# Patient Record
(demographics unavailable — no encounter records)

---

## 2024-11-29 NOTE — DEVELOPMENTAL MILESTONES
[Laughs aloud] : laughs aloud [Turns to voice] : turns to voice [Vocalizes with extending cooing] : vocalizes with extending cooing [Rolls over prone to supine] : rolls over prone to supine [Supports on elbows & wrists in prone] : supports on elbows and wrists in prone [Plays with fingers in midline] : plays with fingers in midline [Grasps objects] : grasps objects

## 2024-11-29 NOTE — DISCUSSION/SUMMARY
[FreeTextEntry1] :  4 month old female here for [facility].   Acute URI - Provided education about diagnosis, and time course of illness. Close monitoring of urinary output.  - Flu/COVID/RSV PCR pending - Reviewed Return precautions  WC - continue ad kunal feeds, return for feeding intolerance  - Counseled on introducing solids - one new food per 2-3 days to monitor for reaction. - continue safe sleep practice - alone, on back and in crib/bassinet. No toys, stuffed, animals, heavy blankets or bumpers - encouraged tummy time to improve head control when awake - Reviewed anticipatory guidance re: fevers, car seat safety - Family defers vaccines today until Arvika is well. to return for Rotavirus, Pentacel & Prevnar - Return in 2mo for routine 6mo WC

## 2024-11-29 NOTE — PHYSICAL EXAM
[Alert] : alert [Acute Distress] : no acute distress [Normocephalic] : normocephalic [Flat Open Anterior [place]] : flat open anterior fontanelle [Red Reflex] : red reflex bilateral [PERRL] : PERRL [Normally Placed Ears] : normally placed ears [Auricles Well Formed] : auricles well formed [Clear Tympanic membranes] : clear tympanic membranes [Light reflex present] : light reflex present [Bony landmarks visible] : bony landmarks visible [Discharge] : no discharge [Nares Patent] : nares patent [Palate Intact] : palate intact [Uvula Midline] : uvula midline [Palpable Masses] : no palpable masses [Regular Rate and Rhythm] : regular rate and rhythm [S1, S2 present] : S1, S2 present [Murmurs] : no murmurs [+2 Femoral Pulses] : (+) 2 femoral pulses [Soft] : soft [Tender] : nontender [Distended] : nondistended [Bowel Sounds] : bowel sounds present [Hepatomegaly] : no hepatomegaly [Splenomegaly] : no splenomegaly [External Genitalia] : normal external genitalia [Clitoromegaly] : no clitoromegaly [Normal Vaginal Introitus] : normal vaginal introitus [Patent] : patent [Normally Placed] : normally placed [No Abnormal Lymph Nodes Palpated] : no abnormal lymph nodes palpated [Palafox-Ortolani] : negative Palafox-Ortolani [Allis Sign] : negative Allis sign [Spinal Dimple] : no spinal dimple [Tuft of Hair] : no tuft of hair [Startle Reflex] : startle reflex present [Plantar Grasp] : plantar grasp reflex present [Symmetric [place]] : symmetric [place] [Rash or Lesions] : no rash/lesions [de-identified] : + nasal congestion [FreeTextEntry7] : Equal air entry, clear lung sounds b/l, no wheezing, crackles or Tachypnea

## 2025-01-13 NOTE — PHYSICAL EXAM
[No Acute Distress] : no acute distress [Alert] : alert [Consolable] : consolable [EOMI] : grossly EOMI [Increased Tearing] : no increased tearing [Clear] : right tympanic membrane clear [Inflamed Nasal Mucosa] : inflamed nasal mucosa [NL] : regular rate and rhythm, normal S1, S2 audible, no murmurs [Soft] : soft [Capillary Refill <2s] : capillary refill < 2s [de-identified] : MMM

## 2025-01-13 NOTE — REVIEW OF SYSTEMS
[Irritable] : no irritability [Nasal Discharge] : nasal discharge [Nasal Congestion] : nasal congestion [Cough] : cough

## 2025-01-13 NOTE — PHYSICAL EXAM
[No Acute Distress] : no acute distress [Alert] : alert [Consolable] : consolable [EOMI] : grossly EOMI [Increased Tearing] : no increased tearing [Clear] : right tympanic membrane clear [Inflamed Nasal Mucosa] : inflamed nasal mucosa [NL] : regular rate and rhythm, normal S1, S2 audible, no murmurs [Soft] : soft [Capillary Refill <2s] : capillary refill < 2s [de-identified] : MMM

## 2025-01-13 NOTE — DISCUSSION/SUMMARY
[FreeTextEntry1] :  5 month girl with URI symptoms, likely secondary to viral illness.  Recommend supportive care including antipyretics, fluids, and nasal saline.  Return if symptoms worsen, develop signs of respiratory distress or dehydration

## 2025-01-13 NOTE — HISTORY OF PRESENT ILLNESS
[de-identified] : COUGH, RUNNY NOSE, NOT SLEEPING WELL [FreeTextEntry6] :  5 month old female with cough, congestion and runny nose for several days. less po but normal urinary output. No shortness of breath or diff breathing.

## 2025-01-13 NOTE — HISTORY OF PRESENT ILLNESS
[de-identified] : COUGH, RUNNY NOSE, NOT SLEEPING WELL [FreeTextEntry6] :  5 month old female with cough, congestion and runny nose for several days. less po but normal urinary output. No shortness of breath or diff breathing.

## 2025-01-27 NOTE — PLAN
[TextEntry] : ADVISED NS DROPS AS NEEDED AND ADEQUATE AIR HUMIDIFICATION; DISCUSSED WHEN SHOULD SEEK FURTHER CARE; RTC AS RECOMMENDED

## 2025-01-27 NOTE — COUNSELING
[Use of Plain Language] : use of plain language [Adequate] : adequate [None] : none [Negative] : Heme/Lymph

## 2025-01-27 NOTE — HISTORY OF PRESENT ILLNESS
[de-identified] :  FEVER 3D PRIOR; CURRENTLY AFEBRILE, SOME NASAL CONGESTION; WAS IN ER 2D PRIOR AND RESP. PANEL, URINE CATH OBTAINED; ALL WNL; FEEDING WELL

## 2025-02-27 NOTE — PHYSICAL EXAM
[Alert] : alert [Normocephalic] : normocephalic [Flat Open Anterior [place]] : flat open anterior fontanelle [Red Reflex] : red reflex bilateral [PERRL] : PERRL [Normally Placed Ears] : normally placed ears [Auricles Well Formed] : auricles well formed [Clear Tympanic membranes] : clear tympanic membranes [Light reflex present] : light reflex present [Bony landmarks visible] : bony landmarks visible [Nares Patent] : nares patent [Palate Intact] : palate intact [Uvula Midline] : uvula midline [Supple, full passive range of motion] : supple, full passive range of motion [Symmetric Chest Rise] : symmetric chest rise [Clear to Auscultation Bilaterally] : clear to auscultation bilaterally [Regular Rate and Rhythm] : regular rate and rhythm [S1, S2 present] : S1, S2 present [+2 Femoral Pulses] : (+) 2 femoral pulses [Soft] : soft [Bowel Sounds] : bowel sounds present [Normal External Genitalia] : normal external genitalia [Normal Vaginal Introitus] : normal vaginal introitus [Patent] : patent [Normally Placed] : normally placed [No Abnormal Lymph Nodes Palpated] : no abnormal lymph nodes palpated [Symmetric Buttocks Creases] : symmetric buttocks creases [Plantar Grasp] : plantar grasp reflex present [Cranial Nerves Grossly Intact] : cranial nerves grossly intact [Acute Distress] : no acute distress [Discharge] : no discharge [Tooth Eruption] : no tooth eruption [Palpable Masses] : no palpable masses [Murmurs] : no murmurs [Tender] : nontender [Distended] : nondistended [Hepatomegaly] : no hepatomegaly [Splenomegaly] : no splenomegaly [Clitoromegaly] : no clitoromegaly [Palafox-Ortolani] : negative Palafox-Ortolani [Allis Sign] : negative Allis sign [Spinal Dimple] : no spinal dimple [Tuft of Hair] : no tuft of hair [de-identified] : random patchy desquamations

## 2025-02-27 NOTE — DISCUSSION/SUMMARY
[Normal Growth] : growth [Normal Development] : development [None] : No medical problems [No Elimination Concerns] : elimination [No Feeding Concerns] : feeding [No Skin Concerns] : skin [Family Functioning] : family functioning [Nutrition and Feeding] : nutrition and feeding [Infant Development] : infant development [Oral Health] : oral health [Safety] : safety [No Medications] : ~He/She~ is not on any medications [Parent/Guardian] : parent/guardian [] : The components of the vaccine(s) to be administered today are listed in the plan of care. The disease(s) for which the vaccine(s) are intended to prevent and the risks have been discussed with the caretaker.  The risks are also included in the appropriate vaccination information statements which have been provided to the patient's caregiver.  The caregiver has given consent to vaccinate. [de-identified] : retreat sleep nightly over 2 wks, motrin trial

## 2025-02-27 NOTE — HISTORY OF PRESENT ILLNESS
[Parents] : parents [Formula ___ oz/feed] : [unfilled] oz of formula per feed [Well-balanced] : well-balanced [Normal] : Normal [No] : No cigarette smoke exposure [Water heater temperature set at <120 degrees F] : Water heater temperature set at <120 degrees F [Rear facing car seat in back seat] : Rear facing car seat in back seat [Carbon Monoxide Detectors] : Carbon monoxide detectors at home [Smoke Detectors] : Smoke detectors at home. [NO] : No [de-identified] : nasal congestion [de-identified] : wakes up overnight and stays awake for 2-3 hrs  [de-identified] : home

## 2025-03-27 NOTE — HISTORY OF PRESENT ILLNESS
[de-identified] : DIGESTION ISSUE. [FreeTextEntry6] : spitting up often now, almost every liquid feed none during solids also tends to pass hard stool difficulty gaining wt

## 2025-04-09 NOTE — DATA REVIEWED
Removed intact [FreeTextEntry1] : - pediatrics notes  history obtained from primary caregiver as independent historian due to patient's developmental age and limited recall

## 2025-04-09 NOTE — HISTORY OF PRESENT ILLNESS
[de-identified] : 8mo F here for initial evaluation of nasal congestion and intermittent cough  +Chronic nasal congestion, uses saline  +Cough comes and goes, more frequently at night and while asleep  Cough dry in nature  +Occasional coughing and vomiting with eating purees  Taken to ED once for such bad coughing and congestion  No noisy breathing  No cyanotic episodes   + Occasional snoring No pausing, choking, or gasping  No recent lung infections  No concerns for hearing or speech   Passed [facility] No family history of bleeding disorder or anesthesia complications Not followed by any specialists

## 2025-04-09 NOTE — REASON FOR VISIT
[Initial Evaluation] : an initial evaluation for [Patient Declined  Services] : - None: Patient declined  services [Nasal Obstruction] : nasal obstruction [FreeTextEntry2] : as per parents patient have nasal congestion all the time, and difficulty with breathing (mouth breathing) [Parents] : parents

## 2025-04-16 NOTE — HISTORY OF PRESENT ILLNESS
[de-identified] : HFU, PATIENT WAS TAKEN TO [facility] LAST NIGHT DUE TO DIFFICULTY BREATHING, NASAL CONGESTION  [FreeTextEntry6] : 8 month old F presenting for ER FUV. She was started on nasal Prednisolone by ENT due to chronic congestion. However since then has had trouble with feeds, increased crying, and fussiness. Does have more rhinorrhea. Fevers 48-72 hrs ago, now afebrile without medicine. Last night had an episode of crying, trouble breathing, vomited. She was shivering and felt cold, so parents rushed her to [place] ER. She was evaluated and well appearing, good vitals per parents. Seems better today.  Also, traveling to [facility] next week for 14 days.

## 2025-04-16 NOTE — DISCUSSION/SUMMARY
[FreeTextEntry1] : 8 month old F with symptoms likely 2/2 new viral URI, here for ER FUV. PE and vitals are wnl. Well appearing.   Recommend supportive care including antipyretics, fluids, and humidifier/warm bath, then nasal saline followed by nasal suction. Education provided for signs of respiratory distress and dehydration. Return if symptoms worsen or persist. MMR today, counseled about side effects as might occur during travel

## 2025-05-31 NOTE — HISTORY OF PRESENT ILLNESS
[de-identified] : VOMITING  [FreeTextEntry6] : 10 month old F presenting for vomiting concerns. Started last night with multiple episodes (9x last night, 2x this morning). Had one stool yesterday that was more odorous but not diarrhea. No fevers. Good energy. Not able to keep water down today, parents have not attempted solids. No known sick contacts. Continuing to void well.

## 2025-05-31 NOTE — PHYSICAL EXAM
[Playful] : playful [NL] : warm, clear [Wheezing] : no wheezing [Crackles] : no crackles [Transmitted Upper Airway Sounds] : no transmitted upper airway sounds [Tachypnea] : no tachypnea [Belly Breathing] : no belly breathing [Tenderness with Palpation] : no tenderness with palpation

## 2025-05-31 NOTE — HISTORY OF PRESENT ILLNESS
[de-identified] : VOMITING  [FreeTextEntry6] : 10 month old F presenting for vomiting concerns. Started last night with multiple episodes (9x last night, 2x this morning). Had one stool yesterday that was more odorous but not diarrhea. No fevers. Good energy. Not able to keep water down today, parents have not attempted solids. No known sick contacts. Continuing to void well.

## 2025-05-31 NOTE — DISCUSSION/SUMMARY
[FreeTextEntry1] : 10 month old F with likely viral cause of emesis. Not dehydrated on exam, however at risk given frequency of emesis.  In order to maintain hydration consume "oral rehydration solution," such as Pedialyte or low calorie sports drinks. If vomiting, try to give child a few teaspoons of fluid every few minutes. Avoid drinking juice, soda, or dairy. These can make diarrhea worse. If tolerating solids, its best to consume lean meats, fruits, vegetables, and whole-grain breads and cereals. Avoid eating foods with a lot of fat or sugar, which can make symptoms worse. Zofran prescribed  If lethargic, not voiding q8hrs, or vomiting w/ Zofran, go to ED for further management

## 2025-06-06 NOTE — DISCUSSION/SUMMARY
[Normal Growth] : growth [Normal Development] : development [None] : No known medical problems [No Elimination Concerns] : elimination [No Feeding Concerns] : feeding [No Skin Concerns] : skin [Normal Sleep Pattern] : sleep [Family Adaptation] : family adaptation [Infant [place]] : infant independence [Feeding Routine] : feeding routine [Safety] : safety [No Medications] : ~He/She~ is not on any medications [Parent/Guardian] : parent/guardian [] : The components of the vaccine(s) to be administered today are listed in the plan of care. The disease(s) for which the vaccine(s) are intended to prevent and the risks have been discussed with the caretaker.  The risks are also included in the appropriate vaccination information statements which have been provided to the patient's caregiver.  The caregiver has given consent to vaccinate.

## 2025-06-06 NOTE — HISTORY OF PRESENT ILLNESS
[Well-balanced] : well-balanced [Normal] : Normal [No] : No cigarette smoke exposure [Water heater temperature set at <120 degrees F] : Water heater temperature set at <120 degrees F [Rear facing car seat in  back seat] : Rear facing car seat in  back seat [Carbon Monoxide Detectors] : Carbon monoxide detectors [Smoke Detectors] : Smoke detectors [NO] : No [de-identified] : HOME

## 2025-06-06 NOTE — DISCUSSION/SUMMARY
[Normal Growth] : growth [Normal Development] : development [None] : No known medical problems [No Elimination Concerns] : elimination [No Feeding Concerns] : feeding [No Skin Concerns] : skin [Normal Sleep Pattern] : sleep [Family Adaptation] : family adaptation [Infant [place]] : infant independence [Feeding Routine] : feeding routine [Safety] : safety [No Medications] : ~He/She~ is not on any medications [Parent/Guardian] : parent/guardian [] : The components of the vaccine(s) to be administered today are listed in the plan of care. The disease(s) for which the vaccine(s) are intended to prevent and the risks have been discussed with the caretaker.  The risks are also included in the appropriate vaccination information statements which have been provided to the patient's caregiver.  The caregiver has given consent to vaccinate.

## 2025-06-06 NOTE — HISTORY OF PRESENT ILLNESS
[Well-balanced] : well-balanced [Normal] : Normal [No] : No cigarette smoke exposure [Water heater temperature set at <120 degrees F] : Water heater temperature set at <120 degrees F [Rear facing car seat in  back seat] : Rear facing car seat in  back seat [Carbon Monoxide Detectors] : Carbon monoxide detectors [Smoke Detectors] : Smoke detectors [NO] : No [de-identified] : HOME

## 2025-06-06 NOTE — PHYSICAL EXAM
[Alert] : alert [Acute Distress] : no acute distress [Normocephalic] : normocephalic [Flat Open Anterior [place]] : flat open anterior fontanelle [Red Reflex] : red reflex bilateral [Excessive Tearing] : no excessive tearing [PERRL] : PERRL [Normally Placed Ears] : normally placed ears [Auricles Well Formed] : auricles well formed [Clear Tympanic membranes] : clear tympanic membranes [Light reflex present] : light reflex present [Bony landmarks visible] : bony landmarks visible [Discharge] : no discharge [Nares Patent] : nares patent [Palate Intact] : palate intact [Uvula Midline] : uvula midline [Supple, full passive range of motion] : supple, full passive range of motion [Palpable Masses] : no palpable masses [Symmetric Chest Rise] : symmetric chest rise [Clear to Auscultation Bilaterally] : clear to auscultation bilaterally [Regular Rate and Rhythm] : regular rate and rhythm [S1, S2 present] : S1, S2 present [Murmurs] : no murmurs [+2 Femoral Pulses] : (+) 2 femoral pulses [Soft] : soft [Tender] : nontender [Distended] : nondistended [Bowel Sounds] : bowel sounds present [Hepatomegaly] : no hepatomegaly [Splenomegaly] : no splenomegaly [Normal External Genitalia] : normal external genitalia [Clitoromegaly] : no clitoromegaly [Normal Vaginal Introitus] : normal vaginal introitus [No Abnormal Lymph Nodes Palpated] : no abnormal lymph nodes palpated [Symmetric abduction and rotation of hips] : symmetric abduction and rotation of hips [Allis Sign] : negative Allis sign [Straight] : straight [Cranial Nerves Grossly Intact] : cranial nerves grossly intact [Rash or Lesions] : no rash/lesions

## 2025-06-06 NOTE — PHYSICAL EXAM
[Alert] : alert [Acute Distress] : no acute distress [Normocephalic] : normocephalic [Flat Open Anterior [place]] : flat open anterior fontanelle [Red Reflex] : red reflex bilateral [Excessive Tearing] : no excessive tearing [PERRL] : PERRL [Normally Placed Ears] : normally placed ears [Auricles Well Formed] : auricles well formed [Clear Tympanic membranes] : clear tympanic membranes [Light reflex present] : light reflex present [Bony landmarks visible] : bony landmarks visible [Discharge] : no discharge [Nares Patent] : nares patent [Palate Intact] : palate intact [Uvula Midline] : uvula midline [Supple, full passive range of motion] : supple, full passive range of motion [Palpable Masses] : no palpable masses [Symmetric Chest Rise] : symmetric chest rise [Clear to Auscultation Bilaterally] : clear to auscultation bilaterally [Regular Rate and Rhythm] : regular rate and rhythm [S1, S2 present] : S1, S2 present [Murmurs] : no murmurs [+2 Femoral Pulses] : (+) 2 femoral pulses [Soft] : soft [Tender] : nontender [Distended] : nondistended [Bowel Sounds] : bowel sounds present [Hepatomegaly] : no hepatomegaly [Splenomegaly] : no splenomegaly [Normal External Genitalia] : normal external genitalia [Clitoromegaly] : no clitoromegaly [Normal Vaginal Introitus] : normal vaginal introitus [No Abnormal Lymph Nodes Palpated] : no abnormal lymph nodes palpated [Symmetric abduction and rotation of hips] : symmetric abduction and rotation of hips [Allis Sign] : negative Allis sign [Straight] : straight [Cranial Nerves Grossly Intact] : cranial nerves grossly intact [Rash or Lesions] : no rash/lesions